# Patient Record
(demographics unavailable — no encounter records)

---

## 2025-07-03 NOTE — PHYSICAL EXAM
[No Acute Distress] : no acute distress [Normal TMs] : both tympanic membranes were normal [Supple] : supple [Clear to Auscultation] : lungs were clear to auscultation bilaterally [Regular Rhythm] : with a regular rhythm [Soft] : abdomen soft [Coordination Grossly Intact] : coordination grossly intact [Normal Gait] : normal gait [Speech Grossly Normal] : speech grossly normal [Normal Mood] : the mood was normal

## 2025-07-03 NOTE — HEALTH RISK ASSESSMENT
[Good] : ~his/her~  mood as  good [No] : No [No falls in past year] : Patient reported no falls in the past year [0] : 2) Feeling down, depressed, or hopeless: Not at all (0) [PHQ-2 Negative - No further assessment needed] : PHQ-2 Negative - No further assessment needed [Former] : Former [0-4] : 0-4 [> 15 Years] : > 15 Years [[id number]] : 0

## 2025-07-03 NOTE — HISTORY OF PRESENT ILLNESS
[de-identified] : Just moved back from [place].  Started a supplement drink.  [facility]  Feels slight pressure in chest walking up a hill.  Has cardio appointment next week.  Having sciatic discomfort in left upper leg.

## 2025-07-28 NOTE — HISTORY OF PRESENT ILLNESS
[N/A] : N/A [Denies] : Denies [No] : No [Yes] : Yes [Declined] : Declined [Informed consent documented in EHR.] : Informed consent documented in EHR. [Left upper extremity] : Left upper extremity [22g] : 22g [Start Time: ___] : Medication Start Time: [unfilled] [End Time: ___] : Medication End Time: [unfilled] [IV discontinued. Intact. No signs or symptoms of IV complications noted. Time: ___] : IV discontinued. Intact. No signs or symptoms of IV complications noted. Time: [unfilled] [Patient  instructed to seek medical attention with signs and symptoms of adverse effects] : Patient  instructed to seek medical attention with signs and symptoms of adverse effects [Patient left unit in no acute distress] : Patient left unit in no acute distress [Medications administered as ordered and tolerated well.] : Medications administered as ordered and tolerated well. [de-identified] : 11:05am [de-identified] : Patient reported that BP readings are elevated more than his normal. Denied any symptoms- headaches, blurry vision, etc. All readings documented in chart. Patient has same day appt. with Dr. Bajwa. RN informed patient to discuss newly elevated BP readings at appt. today. Patient verbalized agreement and understanding.

## 2025-07-29 NOTE — DISCUSSION/SUMMARY
[FreeTextEntry1] : TERRAZAS/fatigue check echo and a ccta if able to approve and schedule. EKG SR with LBBB HTN - RADHIKA  and I had an extensive discussion regarding his blood pressure management. Patient will continue taking current medications in addition to maintaining a low Na diet, with periodic b/p checks at home.  [EKG obtained to assist in diagnosis and management of assessed problem(s)] : EKG obtained to assist in diagnosis and management of assessed problem(s)

## 2025-07-29 NOTE — REASON FOR VISIT
[Symptom and Test Evaluation] : symptom and test evaluation [FreeTextEntry1] : 77M comes in for a visit. He was last seen in 2023. [place]. He remarks on dyspnea. This is often noted with activity. Symptoms always improve at rest. fatigue noted as well. Symptoms are stable. We are discussing an ischemic work up.